# Patient Record
Sex: MALE | Race: BLACK OR AFRICAN AMERICAN
[De-identification: names, ages, dates, MRNs, and addresses within clinical notes are randomized per-mention and may not be internally consistent; named-entity substitution may affect disease eponyms.]

---

## 2018-11-08 ENCOUNTER — HOSPITAL ENCOUNTER (INPATIENT)
Dept: HOSPITAL 74 - YASAS | Age: 53
LOS: 4 days | Discharge: HOME | End: 2018-11-12
Attending: INTERNAL MEDICINE | Admitting: INTERNAL MEDICINE
Payer: COMMERCIAL

## 2018-11-08 VITALS — BODY MASS INDEX: 38.7 KG/M2

## 2018-11-08 DIAGNOSIS — F17.210: ICD-10-CM

## 2018-11-08 DIAGNOSIS — F10.230: Primary | ICD-10-CM

## 2018-11-08 DIAGNOSIS — F14.20: ICD-10-CM

## 2018-11-08 DIAGNOSIS — Z59.0: ICD-10-CM

## 2018-11-08 DIAGNOSIS — R82.90: ICD-10-CM

## 2018-11-08 DIAGNOSIS — R55: ICD-10-CM

## 2018-11-08 DIAGNOSIS — R79.89: ICD-10-CM

## 2018-11-08 DIAGNOSIS — E78.00: ICD-10-CM

## 2018-11-08 LAB
APPEARANCE UR: CLEAR
BILIRUB UR STRIP.AUTO-MCNC: NEGATIVE MG/DL
COLOR UR: YELLOW
EPITH CASTS URNS QL MICRO: (no result) /HPF
KETONES UR QL STRIP: NEGATIVE
LEUKOCYTE ESTERASE UR QL STRIP.AUTO: NEGATIVE
MUCOUS THREADS URNS QL MICRO: (no result)
NITRITE UR QL STRIP: NEGATIVE
PH UR: 5 [PH] (ref 5–8)
PROT UR QL STRIP: (no result)
PROT UR QL STRIP: NEGATIVE
SP GR UR: 1.02 (ref 1.01–1.03)
UROBILINOGEN UR STRIP-MCNC: NEGATIVE MG/DL (ref 0.2–1)

## 2018-11-08 PROCEDURE — HZ2ZZZZ DETOXIFICATION SERVICES FOR SUBSTANCE ABUSE TREATMENT: ICD-10-PCS | Performed by: INTERNAL MEDICINE

## 2018-11-08 RX ADMIN — IBUPROFEN PRN MG: 400 TABLET, FILM COATED ORAL at 14:45

## 2018-11-08 RX ADMIN — HYDROXYZINE PAMOATE PRN MG: 25 CAPSULE ORAL at 14:45

## 2018-11-08 RX ADMIN — Medication PRN MG: at 22:24

## 2018-11-08 RX ADMIN — NICOTINE SCH MG: 21 PATCH TRANSDERMAL at 12:23

## 2018-11-08 RX ADMIN — Medication SCH MG: at 22:23

## 2018-11-08 RX ADMIN — HYDROXYZINE PAMOATE PRN MG: 25 CAPSULE ORAL at 22:25

## 2018-11-08 SDOH — ECONOMIC STABILITY - HOUSING INSECURITY: HOMELESSNESS: Z59.0

## 2018-11-08 NOTE — HP
CIWA Score


Nausea/Vomitin


Muscle Tremors: 2


Anxiety: 2


Agitation: 2


Paroxysmal Sweats: 1-Minimal Palms Moist


Orientation: 0-Oriented


Tacttile Disturbances: 1-Very Mild Itch/Numbness


Auditory Disturbances: 1-Very Mild


Visual Disturbances: 0-None


Headache: 2-Mild


CIWA-Ar Total Score: 13





- Admission Criteria


[facility] Guidelines: Admission for Medically Managed Detox: 


Requires at least one of the followin. CIWA greater than 12


2. Seizures within the past 24 hours


3. Delirium tremens within the past 24 hours


4. Hallucinations within the past 24 hours


5. Acute intervention needed for co  occurring medical disorder


6. Acute intervention needed for co  occurring psychiatric disorder


7. Severe withdrawal that cannot be handled at a lower level of care (continued


    vomiting, continued diarrhea, abnormal vital signs) requiring intravenous


    medication and/or fluids


8. Pregnancy








Admission ROS [facility]





- HPI


Chief Complaint: 





i need help to stop drinking alcohol,cocaine


Allergies/Adverse Reactions: 


 Allergies











Allergy/AdvReac Type Severity Reaction Status Date / Time


 


tomato Allergy  Vomiting Verified 18 10:29











History of Present Illness: 





this 53 years old male with alcohol and cocaine dependence,,seeking detox,

withdrawal symptom,last detox [facility] 13 to 13


hypercholesterolemia


nicotine dependence


longest period of sobriety 3 years


positive ppd





- Ebola screening


Have you traveled outside of the country in the last 21 days: No


Have you had contact with anyone from an Ebola affected area: No


Have you been sick,other than usual withdrawal symptoms: No





- Review of Systems


Constitutional: Night Sweats, Changes in sleep


EENT: reports: Nose Congestion


Respiratory: reports: No Symptoms reported


Cardiac: reports: No Symptoms Reported


GI: reports: Nausea, Poor Appetite, Indigestion


: reports: No Symptoms Reported


Musculoskeletal: reports: Back Pain, Muscle Pain


Integumentary: reports: Dryness


Neuro: reports: Tremors


Endocrine: reports: No Symptoms Reported


Hematology: reports: No Symptoms Reported


Psychiatric: reports: No Sypmtoms Reported, Judgement Intact, Mood/Affect 

Appropiate, Orientated x3





Patient History





- Patient Medical History


Hx Anemia: No


Hx Asthma: No


Hx Chronic Obstructive Pulmonary Disease (COPD): No


Hx Cancer: No


Hx Cardiac Disorders: No


Hx Congestive Heart Failure: No


Hx Hypertension: No


Hx Hypercholesterolemia: No


Hx Pacemaker: No


HX Cerebrovascular Accident: No


Hx Seizures: No


Hx Dementia: No


Hx Diabetes: No


Hx Gastrointestinal Disorders: No


Hx Liver Disease: No


Hx Genitourinary Disorders: No


Hx Sexually Transmitted Disorders: No


Hx Renal Disease (ESRD): No


Hx Thyroid Disease: No


Hx Human Immunodeficiency Virus (HIV): No (last 2017 negative)


Hx Hepatitis C: No


Hx Depression: No


Hx Suicide Attempt: No


Hx Bipolar Disorder: No


Hx Schizophrenia: No


Other Medical History: no suicidal,no homicidal





- Patient Surgical History


Past Surgical History: No


Hx Neurologic Surgery: No


Hx Cataract Extraction: No


Hx Cardiac Surgery: No


Hx Lung Surgery: No


Hx Breast Surgery: No


Hx Breast Biopsy: No


Hx Abdominal Surgery: No


Hx Appendectomy: No


Hx Cholecystectomy: No


Hx Genitourinary Surgery: No


Hx  Section: No


Hx Orthopedic Surgery: No


Anesthesia Reaction: No





- PPD History


Previous Implant?: Yes


Documented Results: Positive w/o proof


Implanted On Prior [facility] Admission?: No


PPD to be Administered?: No





- Smoking Cessation


Smoking history: Current every day smoker


Have you smoked in the past 12 months: Yes


Aproximately how many cigarettes per day: 10


Cigars Per Day: 0


Hx Chewing Tobacco Use: No


Initiated information on smoking cessation: Yes


'Breaking Loose' booklet given: 18





- Substances Abused


  ** Alcohol


Route: Oral


Frequency: Daily


Amount used: 1 pint vodka


Age of first use: 12


Date of Last Use: 17





  ** Crack


Route: Smoking


Frequency: Daily


Amount used: $10


Age of first use: 25


Date of Last Use: 17





Family Disease History





- Family Disease History


Family History: Denies





Admission Physical Exam [facility]





- Vital Signs


Vital Signs: 


 Vital Signs - 24 hr











  18





  10:02


 


Temperature 96 F L


 


Pulse Rate 79


 


Respiratory 20





Rate 


 


Blood Pressure 134/82














- Physical


General Appearance: Yes: Moderate Distress, Tremorous, Irritable, Sweating, 

Anxious


HEENTM: Yes: Normal ENT Inspection, GOKUL, Pharynx Normal


Respiratory: Yes: Lungs Clear, Normal Breath Sounds, No Respiratory Distress


Neck: Yes: Within Normal Limits, Supple, Trachea in good position


Breast: Yes: Within Normal Limits


Cardiology: Yes: Within Normal Limits, Regular Rhythm, Regular Rate, S1, S2


Abdominal: Yes: Within Normal Limits, Normal Bowel Sounds, Non Tender, Flat, 

Soft


Genitourinary: Yes: Within Normal Limits


Back: Yes: Muscle Spasm


Extremities: Yes: Tremors


Integumentary: Yes: Dry


Lymphatic: Yes: Within Normal Limits





- Diagnostic


(1) Alcohol dependence with uncomplicated withdrawal


Current Visit: Yes   Status: Acute   





(2) Cocaine dependence


Current Visit: No   Status: Active   





(3) Syncope


Current Visit: No   Status: Active   





(4) Nicotine dependence


Current Visit: Yes   Status: Acute   





BHS Breath Alcohol Content


Breath Alcohol Content: 0





Urine Drug Screen





- Results


Drug Screen Negative: No


Urine Drug Screen Results: DIEGO-Cocaine, BZO-Benzodiazepines

## 2018-11-08 NOTE — EKG
Test Reason : 

Blood Pressure : ***/*** mmHG

Vent. Rate : 096 BPM     Atrial Rate : 096 BPM

   P-R Int : 120 ms          QRS Dur : 080 ms

    QT Int : 370 ms       P-R-T Axes : 074 060 049 degrees

   QTc Int : 467 ms

 

NORMAL SINUS RHYTHM

BIATRIAL ENLARGEMENT

ABNORMAL ECG

NO PREVIOUS ECGS AVAILABLE

Confirmed by GRACIA BOWER, JOSE MARTIN ([id number]) on 11/8/2018 1:15:07 PM

 

Referred By:             Confirmed By:JOSE MARTIN FAGAN MD

## 2018-11-09 LAB
ALBUMIN SERPL-MCNC: 3.6 G/DL (ref 3.4–5)
ALP SERPL-CCNC: 98 U/L (ref 45–117)
ALT SERPL-CCNC: 60 U/L (ref 13–61)
ANION GAP SERPL CALC-SCNC: 10 MMOL/L (ref 8–16)
AST SERPL-CCNC: 77 U/L (ref 15–37)
BILIRUB SERPL-MCNC: 0.4 MG/DL (ref 0.2–1)
BUN SERPL-MCNC: 16 MG/DL (ref 7–18)
CALCIUM SERPL-MCNC: 9.1 MG/DL (ref 8.5–10.1)
CHLORIDE SERPL-SCNC: 103 MMOL/L (ref 98–107)
CO2 SERPL-SCNC: 26 MMOL/L (ref 21–32)
CREAT SERPL-MCNC: 1.3 MG/DL (ref 0.55–1.3)
DEPRECATED RDW RBC AUTO: 15.5 % (ref 11.9–15.9)
GLUCOSE SERPL-MCNC: 156 MG/DL (ref 74–106)
HCT VFR BLD CALC: 45.2 % (ref 35.4–49)
HGB BLD-MCNC: 14.9 GM/DL (ref 11.7–16.9)
MCH RBC QN AUTO: 31.9 PG (ref 25.7–33.7)
MCHC RBC AUTO-ENTMCNC: 32.9 G/DL (ref 32–35.9)
MCV RBC: 97.2 FL (ref 80–96)
PLATELET # BLD AUTO: 259 K/MM3 (ref 134–434)
PMV BLD: 8.5 FL (ref 7.5–11.1)
POTASSIUM SERPLBLD-SCNC: 4.1 MMOL/L (ref 3.5–5.1)
PROT SERPL-MCNC: 7.8 G/DL (ref 6.4–8.2)
RBC # BLD AUTO: 4.65 M/MM3 (ref 4–5.6)
SODIUM SERPL-SCNC: 139 MMOL/L (ref 136–145)
WBC # BLD AUTO: 5.2 K/MM3 (ref 4–10)

## 2018-11-09 RX ADMIN — Medication SCH MG: at 22:15

## 2018-11-09 RX ADMIN — HYDROXYZINE PAMOATE PRN MG: 25 CAPSULE ORAL at 19:52

## 2018-11-09 RX ADMIN — NICOTINE SCH MG: 21 PATCH TRANSDERMAL at 10:54

## 2018-11-09 RX ADMIN — IBUPROFEN PRN MG: 400 TABLET, FILM COATED ORAL at 19:51

## 2018-11-09 RX ADMIN — Medication PRN MG: at 22:15

## 2018-11-09 RX ADMIN — Medication SCH TAB: at 10:54

## 2018-11-09 NOTE — PN
[facility] CIWA





- CIWA Score


Nausea/Vomiting: 3


Muscle Tremors: 4-Moderate,w/Arms Extend


Anxiety: 4-Mod. Anxious/Guarded


Agitation: 3


Paroxysmal Sweats: No Perspiration


Orientation: 0-Oriented


Tacttile Disturbances: 0-None


Auditory Disturbances: 0-None


Visual Disturbances: 0-None


Headache: 1-Very Mild


CIWA-Ar Total Score: 15





BHS Progress Note (SOAP)


Subjective: 





Tremor, interrupted sleep, sweating


Objective: 





11/09/18 16:09


 Last Vital Signs











Temp Pulse Resp BP Pulse Ox


 


 96.5 F L  87   18   128/77    


 


 11/09/18 15:45  11/09/18 15:45  11/09/18 15:45  11/09/18 15:45   








 Laboratory Tests











  11/08/18 11/09/18 11/09/18





  15:48 06:00 06:00


 


WBC   5.2 


 


RBC   4.65 


 


Hgb   14.9 


 


Hct   45.2 


 


MCV   97.2 H 


 


MCH   31.9 


 


MCHC   32.9 


 


RDW   15.5 


 


Plt Count   259 


 


MPV   8.5 


 


Sodium    139


 


Potassium    4.1


 


Chloride    103


 


Carbon Dioxide    26


 


Anion Gap    10


 


BUN    16


 


Creatinine    1.3


 


Creat Clearance w eGFR    57.53


 


Random Glucose    156 H


 


Calcium    9.1


 


Total Bilirubin    0.4


 


AST    77 H


 


ALT    60


 


Alkaline Phosphatase    98


 


Total Protein    7.8


 


Albumin    3.6


 


Urine Color  Yellow  


 


Urine Appearance  Clear  


 


Urine pH  5.0  


 


Ur Specific Gravity  1.024  


 


Urine Protein  1+ H  


 


Urine Glucose (UA)  Negative  


 


Urine Ketones  Negative  


 


Urine Blood  Negative  


 


Urine Nitrite  Negative  


 


Urine Bilirubin  Negative  


 


Urine Urobilinogen  Negative  


 


Ur Leukocyte Esterase  Negative  


 


Urine WBC (Auto)  2  


 


Urine RBC (Auto)  None  


 


Ur Epithelial Cells  Rare  


 


Urine Mucus  Rare  


 


RPR Titer   














  11/09/18





  06:00


 


WBC 


 


RBC 


 


Hgb 


 


Hct 


 


MCV 


 


MCH 


 


MCHC 


 


RDW 


 


Plt Count 


 


MPV 


 


Sodium 


 


Potassium 


 


Chloride 


 


Carbon Dioxide 


 


Anion Gap 


 


BUN 


 


Creatinine 


 


Creat Clearance w eGFR 


 


Random Glucose 


 


Calcium 


 


Total Bilirubin 


 


AST 


 


ALT 


 


Alkaline Phosphatase 


 


Total Protein 


 


Albumin 


 


Urine Color 


 


Urine Appearance 


 


Urine pH 


 


Ur Specific Gravity 


 


Urine Protein 


 


Urine Glucose (UA) 


 


Urine Ketones 


 


Urine Blood 


 


Urine Nitrite 


 


Urine Bilirubin 


 


Urine Urobilinogen 


 


Ur Leukocyte Esterase 


 


Urine WBC (Auto) 


 


Urine RBC (Auto) 


 


Ur Epithelial Cells 


 


Urine Mucus 


 


RPR Titer  Nonreactive








Labs reviewed: prerenal azotemia and abnormal UA


Assessment: 





11/09/18 16:09


Withdrawal sxs





Noted with prerenal azotemia and abnormal UA


Plan: 





Continue detox





Prerenal azotemia: encouraged to drink more water





Abnormal UA: encouraged PO water intake, repeat UA

## 2018-11-10 RX ADMIN — Medication SCH TAB: at 10:58

## 2018-11-10 RX ADMIN — Medication SCH MG: at 22:22

## 2018-11-10 RX ADMIN — NICOTINE SCH MG: 21 PATCH TRANSDERMAL at 10:58

## 2018-11-10 RX ADMIN — Medication PRN MG: at 22:22

## 2018-11-10 NOTE — PN
[facility] CIWA





- CIWA Score


Nausea/Vomitin-No Nausea/No Vomiting


Muscle Tremors: 1-None Visible, but Felt


Anxiety: 3


Agitation: 3


Paroxysmal Sweats: 2


Orientation: 0-Oriented


Tacttile Disturbances: 1-Very Mild Itch/Numbness


Auditory Disturbances: 0-None


Visual Disturbances: 1-Very Mild Sensitivity


Headache: 0-None Present


CIWA-Ar Total Score: 11





BHS Progress Note (SOAP)


Subjective: 





irritable, anxious, interrupted sleep 


Objective: 





 Vital Signs











Temperature  97.4 F L  11/10/18 10:56


 


Pulse Rate  83   11/10/18 10:56


 


Respiratory Rate  20   11/10/18 10:56


 


Blood Pressure  135/81   11/10/18 10:56


 


O2 Sat by Pulse Oximetry (%)      








 Laboratory Last Values











WBC  5.2 K/mm3 (4.0-10.0)   18  06:00    


 


RBC  4.65 M/mm3 (4.00-5.60)   18  06:00    


 


Hgb  14.9 GM/dL (11.7-16.9)   18  06:00    


 


Hct  45.2 % (35.4-49)   18  06:00    


 


MCV  97.2 fl (80-96)  H  18  06:00    


 


MCH  31.9 pg (25.7-33.7)   18  06:00    


 


MCHC  32.9 g/dl (32.0-35.9)   18  06:00    


 


RDW  15.5 % (11.9-15.9)   18  06:00    


 


Plt Count  259 K/MM3 (134-434)   18  06:00    


 


MPV  8.5 fl (7.5-11.1)   18  06:00    


 


Sodium  139 mmol/L (136-145)   18  06:00    


 


Potassium  4.1 mmol/L (3.5-5.1)   18  06:00    


 


Chloride  103 mmol/L ()   18  06:00    


 


Carbon Dioxide  26 mmol/L (21-32)   18  06:00    


 


Anion Gap  10 MMOL/L (8-16)   18  06:00    


 


BUN  16 mg/dL (7-18)   18  06:00    


 


Creatinine  1.3 mg/dL (0.55-1.3)   18  06:00    


 


Creat Clearance w eGFR  57.53  (>60)   18  06:00    


 


Random Glucose  156 mg/dL ()  H  18  06:00    


 


Calcium  9.1 mg/dL (8.5-10.1)   18  06:00    


 


Total Bilirubin  0.4 mg/dL (0.2-1)   18  06:00    


 


AST  77 U/L (15-37)  H  18  06:00    


 


ALT  60 U/L (13-61)   18  06:00    


 


Alkaline Phosphatase  98 U/L ()   18  06:00    


 


Total Protein  7.8 g/dl (6.4-8.2)   18  06:00    


 


Albumin  3.6 g/dl (3.4-5.0)   18  06:00    


 


Urine Color  Yellow   18  15:48    


 


Urine Appearance  Clear   18  15:48    


 


Urine pH  5.0  (5.0-8.0)   18  15:48    


 


Ur Specific Gravity  1.024  (1.010-1.035)   18  15:48    


 


Urine Protein  1+  (NEGATIVE)  H  18  15:48    


 


Urine Glucose (UA)  Negative  (NEGATIVE)   18  15:48    


 


Urine Ketones  Negative  (NEGATIVE)   18  15:48    


 


Urine Blood  Negative  (NEGATIVE)   18  15:48    


 


Urine Nitrite  Negative  (NEGATIVE)   18  15:48    


 


Urine Bilirubin  Negative  (<2.0 mg/dL)   18  15:48    


 


Urine Urobilinogen  Negative mg/dL (0.2-1.0)   18  15:48    


 


Ur Leukocyte Esterase  Negative  (NEGATIVE)   18  15:48    


 


Urine WBC (Auto)  2 /hpf (3-5)   18  15:48    


 


Urine RBC (Auto)  None /hpf (0-3)   18  15:48    


 


Ur Epithelial Cells  Rare /HPF (FEW)   18  15:48    


 


Urine Mucus  Rare   18  15:48    


 


RPR Titer  Nonreactive  (NONREACTIVE)   18  06:00    








Aox3 no distress, irritable


no adventitious breath sounds 


full ROM ambulatory 


Assessment: 





11/10/18 12:22


withdrawal sx 


Plan: 





increase fluids 


continue detox 


continue to monitor

## 2018-11-11 RX ADMIN — Medication SCH MG: at 22:37

## 2018-11-11 RX ADMIN — HYDROXYZINE PAMOATE PRN MG: 25 CAPSULE ORAL at 19:22

## 2018-11-11 RX ADMIN — Medication PRN MG: at 22:37

## 2018-11-11 RX ADMIN — HYDROXYZINE PAMOATE PRN MG: 25 CAPSULE ORAL at 22:37

## 2018-11-11 RX ADMIN — Medication SCH: at 11:47

## 2018-11-11 RX ADMIN — IBUPROFEN PRN MG: 400 TABLET, FILM COATED ORAL at 06:11

## 2018-11-11 RX ADMIN — HYDROXYZINE PAMOATE PRN MG: 25 CAPSULE ORAL at 06:10

## 2018-11-11 RX ADMIN — NICOTINE SCH: 21 PATCH TRANSDERMAL at 11:47

## 2018-11-11 NOTE — PN
BHS Progress Note (SOAP)


Subjective: 





Anxious, interrupted sleep


Objective: 





11/11/18 15:16


 Last Vital Signs











Temp Pulse Resp BP Pulse Ox


 


 97.4 F L  78   20   114/71    


 


 11/11/18 09:56  11/11/18 09:56  11/11/18 09:56  11/11/18 09:56   








 Laboratory Tests











  11/08/18 11/09/18 11/09/18





  15:48 06:00 06:00


 


WBC   5.2 


 


RBC   4.65 


 


Hgb   14.9 


 


Hct   45.2 


 


MCV   97.2 H 


 


MCH   31.9 


 


MCHC   32.9 


 


RDW   15.5 


 


Plt Count   259 


 


MPV   8.5 


 


Sodium    139


 


Potassium    4.1


 


Chloride    103


 


Carbon Dioxide    26


 


Anion Gap    10


 


BUN    16


 


Creatinine    1.3


 


Creat Clearance w eGFR    57.53


 


Random Glucose    156 H


 


Calcium    9.1


 


Total Bilirubin    0.4


 


AST    77 H


 


ALT    60


 


Alkaline Phosphatase    98


 


Total Protein    7.8


 


Albumin    3.6


 


Urine Color  Yellow  


 


Urine Appearance  Clear  


 


Urine pH  5.0  


 


Ur Specific Gravity  1.024  


 


Urine Protein  1+ H  


 


Urine Glucose (UA)  Negative  


 


Urine Ketones  Negative  


 


Urine Blood  Negative  


 


Urine Nitrite  Negative  


 


Urine Bilirubin  Negative  


 


Urine Urobilinogen  Negative  


 


Ur Leukocyte Esterase  Negative  


 


Urine WBC (Auto)  2  


 


Urine RBC (Auto)  None  


 


Ur Epithelial Cells  Rare  


 


Urine Mucus  Rare  


 


RPR Titer   














  11/09/18





  06:00


 


WBC 


 


RBC 


 


Hgb 


 


Hct 


 


MCV 


 


MCH 


 


MCHC 


 


RDW 


 


Plt Count 


 


MPV 


 


Sodium 


 


Potassium 


 


Chloride 


 


Carbon Dioxide 


 


Anion Gap 


 


BUN 


 


Creatinine 


 


Creat Clearance w eGFR 


 


Random Glucose 


 


Calcium 


 


Total Bilirubin 


 


AST 


 


ALT 


 


Alkaline Phosphatase 


 


Total Protein 


 


Albumin 


 


Urine Color 


 


Urine Appearance 


 


Urine pH 


 


Ur Specific Gravity 


 


Urine Protein 


 


Urine Glucose (UA) 


 


Urine Ketones 


 


Urine Blood 


 


Urine Nitrite 


 


Urine Bilirubin 


 


Urine Urobilinogen 


 


Ur Leukocyte Esterase 


 


Urine WBC (Auto) 


 


Urine RBC (Auto) 


 


Ur Epithelial Cells 


 


Urine Mucus 


 


RPR Titer  Nonreactive








Labs reviewed: prerenal azotemia, abnormal UA


Assessment: 





11/11/18 15:16


Withdrawal sxs





Noted with prerenal azotemia and abnormal UA


Plan: 





Continue detox





Prerenal azotemia: encouraged PO water intake for hydration





Abnormal UA: repeat UA already ordered

## 2018-11-12 VITALS — DIASTOLIC BLOOD PRESSURE: 61 MMHG | HEART RATE: 83 BPM | TEMPERATURE: 97.8 F | SYSTOLIC BLOOD PRESSURE: 96 MMHG

## 2018-11-12 RX ADMIN — Medication SCH: at 10:30

## 2018-11-12 RX ADMIN — NICOTINE SCH: 21 PATCH TRANSDERMAL at 10:30

## 2018-11-12 NOTE — DS
BHS Detox Discharge Summary


Admission Date: 


11/08/18





Discharge Date: 11/12/18





- History


Additional Comments: 





Pt completed detox today


In no distress


Being discharged. 


Pt in discussion with counselor on shelter options


Will attend outpatient AA meetings


Denies home meds











- Physical Exam Results


Vital Signs: 


 Vital Signs











Temperature  97.8 F   11/12/18 09:19


 


Pulse Rate  83   11/12/18 09:19


 


Respiratory Rate  18   11/12/18 09:19


 


Blood Pressure  96/61   11/12/18 09:19


 


O2 Sat by Pulse Oximetry (%)      











Pertinent Admission Physical Exam Findings: 





witHDRAWAL SX





- Treatment


Hospital Course: Detox Protocol Followed, Detoxed Safely, Responded well, 

Discharged Condition Good





- Medication


Discharge Medications: 


Ambulatory Orders





NK [No Known Home Medication]  11/08/18 











- AMA


Did Patient Leave Against Medical Advice: No

## 2018-11-12 NOTE — PN
BHS Progress Note (SOAP)


Subjective: 





Denies any complaints


Objective: 





11/12/18 15:06


A & O x 3


Gait steady


 Vital Signs











Temperature  97.8 F   11/12/18 09:19


 


Pulse Rate  83   11/12/18 09:19


 


Respiratory Rate  18   11/12/18 09:19


 


Blood Pressure  96/61   11/12/18 09:19


 


O2 Sat by Pulse Oximetry (%)      











Assessment: 





11/12/18 15:06


detox completed


Plan: 





For discharge

## 2020-10-30 ENCOUNTER — EMERGENCY (EMERGENCY)
Facility: HOSPITAL | Age: 55
LOS: 1 days | Discharge: HOME | End: 2020-10-30
Attending: EMERGENCY MEDICINE | Admitting: EMERGENCY MEDICINE
Payer: MEDICAID

## 2020-10-30 VITALS
HEART RATE: 74 BPM | OXYGEN SATURATION: 96 % | HEIGHT: 64 IN | WEIGHT: 179.9 LBS | SYSTOLIC BLOOD PRESSURE: 120 MMHG | RESPIRATION RATE: 18 BRPM | DIASTOLIC BLOOD PRESSURE: 85 MMHG | TEMPERATURE: 98 F

## 2020-10-30 DIAGNOSIS — Z00.8 ENCOUNTER FOR OTHER GENERAL EXAMINATION: ICD-10-CM

## 2020-10-30 PROCEDURE — 99283 EMERGENCY DEPT VISIT LOW MDM: CPT

## 2020-10-30 NOTE — ED ADULT NURSE NOTE - CHPI ED NUR SYMPTOMS NEG
no vomiting/no pain/no tingling/no fever/no nausea/no decreased eating/drinking/no dizziness/no weakness/no chills

## 2020-10-30 NOTE — ED PROVIDER NOTE - NSFOLLOWUPINSTRUCTIONS_ED_ALL_ED_FT
Follow up with your primary care doctor or clinics listed below if you do not have a doctor  [address] [address]  To make an appointment, call [phone]  [facility]  Address: [address] [place] [id number]  Appointment Center: [phone] ([phone])   Return immediately for any new or worsening symptoms or any new concerns

## 2020-10-30 NOTE — ED PROVIDER NOTE - PATIENT PORTAL LINK FT
You can access the FollowMyHealth Patient Portal offered by [facility] by registering at the following website: [email or web address]. By joining [employer]’s FollowMyHealth portal, you will also be able to view your health information using other applications (apps) compatible with our system.

## 2020-10-30 NOTE — ED PROVIDER NOTE - CLINICAL SUMMARY MEDICAL DECISION MAKING FREE TEXT BOX
pt requesting detox information, also requesting medication for alcohol withdrawal, no tremors, no tongue fasciculations, does not appear in withdrawal.  outpatient detox info given.

## 2020-10-30 NOTE — ED ADULT NURSE NOTE - NSIMPLEMENTINTERV_GEN_ALL_ED
Implemented All Universal Safety Interventions:  [place] to call system. Call bell, personal items and telephone within reach. Instruct patient to call for assistance. Room bathroom lighting operational. Non-slip footwear when patient is off stretcher. Physically safe environment: no spills, clutter or unnecessary equipment. Stretcher in lowest position, wheels locked, appropriate side rails in place.

## 2020-10-30 NOTE — ED ADULT TRIAGE NOTE - CHIEF COMPLAINT QUOTE
Pt BIB[place], [place] train. Pt arrives asking for a detox facility. Pt also asking for alcohol in triage. Pt has clear speech and steady gait.

## 2020-10-30 NOTE — ED PROVIDER NOTE - PHYSICAL EXAMINATION
Physical Exam  GEN: Awake, alert, non-toxic appearing, NCAT  EYES: full EOMI,  ENT: External inspection normal, normal voice,   HEAD: atraumatic  NECK: FROM neck, supple,   RESP: no tachypnea, no hypoxia, no resp distress,  MSK: [place] x 4, no deformity  SKIN: no diaphoresis,   NEURO: ao x 3, clear speech, steady gait, no facial asymmetry, no tremors noted to extremities, no tongue fasciculations

## 2020-10-30 NOTE — ED PROVIDER NOTE - OBJECTIVE STATEMENT
55 yom pw requesting detox.  per EMS, pt was found to be sleeping at [facility], was asked to leave premise by authority.  pt flagged down EMS, requesting them to take him to a detox center.  pt also states he's in alcohol withdrawal.  denies any fc/cp/sob/abd pain/nv/ha.  reports drinking daily, last drank was earlier today.

## 2021-05-12 ENCOUNTER — HOSPITAL ENCOUNTER (INPATIENT)
Dept: HOSPITAL 74 - YASAS | Age: 56
LOS: 1 days | Discharge: LEFT BEFORE DISCHARGE | DRG: 770 | End: 2021-05-13
Attending: ALLERGY & IMMUNOLOGY | Admitting: ALLERGY & IMMUNOLOGY
Payer: COMMERCIAL

## 2021-05-12 VITALS — BODY MASS INDEX: 40.1 KG/M2

## 2021-05-12 DIAGNOSIS — Z56.0: ICD-10-CM

## 2021-05-12 DIAGNOSIS — F17.210: ICD-10-CM

## 2021-05-12 DIAGNOSIS — F10.20: Primary | ICD-10-CM

## 2021-05-12 DIAGNOSIS — Z59.0: ICD-10-CM

## 2021-05-12 DIAGNOSIS — K86.0: ICD-10-CM

## 2021-05-12 DIAGNOSIS — E78.5: ICD-10-CM

## 2021-05-12 DIAGNOSIS — F14.20: ICD-10-CM

## 2021-05-12 PROCEDURE — HZ42ZZZ GROUP COUNSELING FOR SUBSTANCE ABUSE TREATMENT, COGNITIVE-BEHAVIORAL: ICD-10-PCS | Performed by: ALLERGY & IMMUNOLOGY

## 2021-05-12 SDOH — ECONOMIC STABILITY - HOUSING INSECURITY: HOMELESSNESS: Z59.0

## 2021-05-12 SDOH — ECONOMIC STABILITY - INCOME SECURITY: UNEMPLOYMENT, UNSPECIFIED: Z56.0

## 2021-05-13 VITALS — TEMPERATURE: 98.3 F | HEART RATE: 89 BPM | DIASTOLIC BLOOD PRESSURE: 83 MMHG | SYSTOLIC BLOOD PRESSURE: 130 MMHG

## 2021-05-13 LAB
ALBUMIN SERPL-MCNC: 2.8 G/DL (ref 3.4–5)
ALP SERPL-CCNC: 81 U/L (ref 45–117)
ALT SERPL-CCNC: 36 U/L (ref 13–61)
ANION GAP SERPL CALC-SCNC: 5 MMOL/L (ref 8–16)
AST SERPL-CCNC: 34 U/L (ref 15–37)
BILIRUB SERPL-MCNC: 0.4 MG/DL (ref 0.2–1)
BUN SERPL-MCNC: 17.7 MG/DL (ref 7–18)
CALCIUM SERPL-MCNC: 8.4 MG/DL (ref 8.5–10.1)
CHLORIDE SERPL-SCNC: 106 MMOL/L (ref 98–107)
CO2 SERPL-SCNC: 27 MMOL/L (ref 21–32)
CREAT SERPL-MCNC: 1.2 MG/DL (ref 0.55–1.3)
DEPRECATED RDW RBC AUTO: 15.2 % (ref 11.9–15.9)
GLUCOSE SERPL-MCNC: 171 MG/DL (ref 74–106)
HCT VFR BLD CALC: 37.6 % (ref 35.4–49)
HGB BLD-MCNC: 12.9 GM/DL (ref 11.7–16.9)
MCH RBC QN AUTO: 32.5 PG (ref 25.7–33.7)
MCHC RBC AUTO-ENTMCNC: 34.4 G/DL (ref 32–35.9)
MCV RBC: 94.5 FL (ref 80–96)
PLATELET # BLD AUTO: 211 K/MM3 (ref 134–434)
PMV BLD: 7.7 FL (ref 7.5–11.1)
PROT SERPL-MCNC: 6.3 G/DL (ref 6.4–8.2)
RBC # BLD AUTO: 3.97 M/MM3 (ref 4–5.6)
SODIUM SERPL-SCNC: 138 MMOL/L (ref 136–145)
WBC # BLD AUTO: 4.2 K/MM3 (ref 4–10)

## 2021-05-13 RX ADMIN — HYDROXYZINE PAMOATE SCH: 25 CAPSULE ORAL at 13:46

## 2021-05-13 RX ADMIN — HYDROXYZINE PAMOATE SCH: 25 CAPSULE ORAL at 11:01

## 2021-05-13 RX ADMIN — HYDROXYZINE PAMOATE SCH: 25 CAPSULE ORAL at 07:26

## 2022-09-15 NOTE — ED ADULT NURSE NOTE - OBJECTIVE STATEMENT
96 56 y/o M BIBA. Pt [place] asleep on the train. Pt denies any medical complaints. Pt asking for a detox facility and asking for alcohol in triage. Pt denies PMH.